# Patient Record
Sex: MALE | Race: WHITE | NOT HISPANIC OR LATINO | Employment: FULL TIME | URBAN - METROPOLITAN AREA
[De-identification: names, ages, dates, MRNs, and addresses within clinical notes are randomized per-mention and may not be internally consistent; named-entity substitution may affect disease eponyms.]

---

## 2022-06-06 ENCOUNTER — HOSPITAL ENCOUNTER (EMERGENCY)
Facility: CLINIC | Age: 47
Discharge: HOME | End: 2022-06-06
Attending: EMERGENCY MEDICINE | Admitting: EMERGENCY MEDICINE
Payer: COMMERCIAL

## 2022-06-06 VITALS
HEIGHT: 72 IN | TEMPERATURE: 100.1 F | WEIGHT: 195 LBS | SYSTOLIC BLOOD PRESSURE: 146 MMHG | DIASTOLIC BLOOD PRESSURE: 110 MMHG | RESPIRATION RATE: 18 BRPM | BODY MASS INDEX: 26.41 KG/M2 | OXYGEN SATURATION: 95 % | HEART RATE: 109 BPM

## 2022-06-06 DIAGNOSIS — U07.1 INFECTION DUE TO 2019 NOVEL CORONAVIRUS: ICD-10-CM

## 2022-06-06 LAB
FLUAV RNA SPEC QL NAA+PROBE: NEGATIVE
FLUBV RNA RESP QL NAA+PROBE: NEGATIVE
SARS-COV-2 RNA RESP QL NAA+PROBE: POSITIVE

## 2022-06-06 PROCEDURE — 99283 EMERGENCY DEPT VISIT LOW MDM: CPT | Mod: CS | Performed by: EMERGENCY MEDICINE

## 2022-06-06 PROCEDURE — 87636 SARSCOV2 & INF A&B AMP PRB: CPT | Performed by: EMERGENCY MEDICINE

## 2022-06-06 PROCEDURE — C9803 HOPD COVID-19 SPEC COLLECT: HCPCS | Performed by: EMERGENCY MEDICINE

## 2022-06-06 PROCEDURE — 99282 EMERGENCY DEPT VISIT SF MDM: CPT | Mod: CS | Performed by: EMERGENCY MEDICINE

## 2022-06-06 NOTE — ED TRIAGE NOTES
Fever, body  aches, headache since Saturday, up to 104 at home, last time had similar sx had lymes disease. No known sick contacts     Triage Assessment     Row Name 06/06/22 8018       Triage Assessment (Adult)    Airway WDL WDL       Respiratory WDL    Respiratory WDL WDL       Skin Circulation/Temperature WDL    Skin Circulation/Temperature WDL X;temperature    Skin Temperature warm       Cardiac WDL    Cardiac WDL X;rhythm    Cardiac Rhythm tachycardic       Peripheral/Neurovascular WDL    Peripheral Neurovascular WDL WDL       Cognitive/Neuro/Behavioral WDL    Cognitive/Neuro/Behavioral WDL WDL

## 2022-06-06 NOTE — DISCHARGE INSTRUCTIONS
Mask, distance from others    Drink plenty fluids and rest    Tylenol ibuprofen for discomfort    Return/recheck for progressive shortness of air, vomiting, passing out or oxygen saturation below 90%

## 2022-06-06 NOTE — ED PROVIDER NOTES
History     Chief Complaint   Patient presents with     Fever     Fever, body  aches, headache since Saturday, up to 104 at home, last time had similar sx had lymes disease.      HPI  Konstantin Smith is a 46 year old male who presents with 3 days of headache, fever, myalgias.  Denies cough or shortness of air.  No vomiting or diarrhea.  No pulmonary disease.  Active smoker.  Initial 2 vaccinations for COVID in March and April of last year.    Allergies:  No Known Allergies    Problem List:    There are no problems to display for this patient.       Past Medical History:    No past medical history on file.    Past Surgical History:    No past surgical history on file.    Family History:    No family history on file.    Social History:  Marital Status:  Single [1]        Medications:    No current outpatient medications on file.        Review of Systems  All other systems reviewed and are negative.    Physical Exam   BP: (!) 146/110  Pulse: 109  Temp: 100.1  F (37.8  C)  Resp: 18  Height: 182.9 cm (6')  Weight: 88.5 kg (195 lb)  SpO2: 95 %      Physical Exam  Nontoxic appearing no respiratory distress alert and oriented ×3  Head atraumatic normocephalic  Neck supple full active painless range of motion  Lungs clear to auscultation  Heart regular tachycardic no murmur  Strength and sensation grossly intact throughout the extremities, gait and station normal  Speech is fluent, good eye contact, thought processes are rational      ED Course                 Procedures              Critical Care time:  none               Results for orders placed or performed during the hospital encounter of 06/06/22 (from the past 24 hour(s))   Symptomatic; Yes; 6/4/2022 Influenza A/B & SARS-CoV2 (COVID-19) Virus PCR Multiplex Nose    Specimen: Nose; Swab   Result Value Ref Range    Influenza A PCR Negative Negative    Influenza B PCR Negative Negative    SARS CoV2 PCR Positive (A) Negative    Narrative    Testing was performed using  the [employer] SARS-CoV-2 & Influenza A/B Assay on the [employer] System. This test should be ordered for the detection of SARS-CoV-2 and influenza viruses in individuals who meet clinical and/or epidemiological criteria. Test performance is unknown in asymptomatic patients. This test is for in vitro diagnostic use under the FDA EUA for laboratories certified under CLIA to perform moderate and/or high complexity testing. This test has not been FDA cleared or approved. A negative result does not rule out the presence of PCR inhibitors in the specimen or target RNA in concentration below the limit of detection for the assay. If only one viral target is positive but coinfection with multiple targets is suspected, the sample should be re-tested with another FDA cleared, approved or authorized test, if coinfection would change clinical management. [facility] are certified under the Clinical Laboratory Improvement Amendments of 1988 (CLIA-88) as  qualified to perform moderate and/or high complexity laboratory testing.       Medications - No data to display    Assessments & Plan (with Medical Decision Making)  Positive COVID test, vitals normal except for fever and tachycardia.  No vomiting, no shortness of air, lungs are clear.  Symptomatic cares.  Isolate and mask.  Return criteria reviewed     I have reviewed the nursing notes.    I have reviewed the findings, diagnosis, plan and need for follow up with the patient.       New Prescriptions    No medications on file       Final diagnoses:   Infection due to 2019 novel coronavirus       6/6/2022   [facility] EMERGENCY DEPT     Julio Dyer MD  06/06/22 0900